# Patient Record
Sex: MALE | Race: WHITE | NOT HISPANIC OR LATINO | Employment: FULL TIME | URBAN - METROPOLITAN AREA
[De-identification: names, ages, dates, MRNs, and addresses within clinical notes are randomized per-mention and may not be internally consistent; named-entity substitution may affect disease eponyms.]

---

## 2018-09-17 ENCOUNTER — THERAPY VISIT (OUTPATIENT)
Dept: PHYSICAL THERAPY | Facility: CLINIC | Age: 48
End: 2018-09-17
Payer: COMMERCIAL

## 2018-09-17 DIAGNOSIS — M54.2 CERVICALGIA: Primary | ICD-10-CM

## 2018-09-17 PROCEDURE — 97110 THERAPEUTIC EXERCISES: CPT | Mod: GP | Performed by: PHYSICAL THERAPIST

## 2018-09-17 PROCEDURE — 97140 MANUAL THERAPY 1/> REGIONS: CPT | Mod: GP | Performed by: PHYSICAL THERAPIST

## 2018-09-17 PROCEDURE — 97161 PT EVAL LOW COMPLEX 20 MIN: CPT | Mod: GP | Performed by: PHYSICAL THERAPIST

## 2018-09-17 NOTE — MR AVS SNAPSHOT
"              After Visit Summary   9/17/2018    Leroy Padilla    MRN: [id number]           Patient Information     Date Of Birth          1970        Visit Information        Provider Department      9/17/2018 12:10 PM [facility]        Today's Diagnoses     Cervicalgia    -  1       Follow-ups after your visit        Your next 10 appointments already scheduled     Sep 24, 2018 11:30 AM CDT   (Arrive by 11:15 AM)   [facility] Spine with Chaya Anderson PT   [facility] ([facility])    [address]  [facility] [address] [phone]              Who to contact     If you have questions or need follow up information about today's clinic visit or your schedule please contact [facility] directly at [phone].  Normal or non-critical lab and imaging results will be communicated to you by Plumhart, letter or phone within 4 business days after the clinic has received the results. If you do not hear from us within 7 days, please contact the clinic through Plumhart or phone. If you have a critical or abnormal lab result, we will notify you by phone as soon as possible.  Submit refill requests through [employer] or call your pharmacy and they will forward the refill request to us. Please allow 3 business days for your refill to be completed.          Additional Information About Your Visit        PlumharStalkthis Information     [employer] lets you send messages to your doctor, view your test results, renew your prescriptions, schedule appointments and more. To sign up, go to [email or web address] . Click on \"Log in\" on the left side of the screen, which will take you to the Welcome page. Then click on \"Sign up Now\" on the right side of the page.     You will be asked to enter the access code listed below, as well as some personal information. " Please follow the directions to create your username and password.     Your access code is: [email or web address]  Expires: 2018  3:11 PM     Your access code will  in 90 days. If you need help or a new code, please call your [place] clinic or [phone].        Care EveryWhere ID     This is your Care EveryWhere ID. This could be used by other organizations to access your [place] medical records  [id number]         Blood Pressure from Last 3 Encounters:   No data found for BP    Weight from Last 3 Encounters:   No data found for Wt              We Performed the Following     MICHELLE Inital Eval Report     Manual Ther Tech, 1+Regions, EA 15 min     PT Eval, Low Complexity (955[address])     Therapeutic Exercises        Primary Care Provider Office Phone # Fax #    Ssz JOEY AURE Boswell [phone]       [facility] [address] [address]  [facility] [address]        Equal Access to Services     MAZIN MARTIN : Hadii aad ku hadasho Soomaali, waaxda luqadaha, qaybta kaalmada adeegyada, waxay idiin hayaan ariana max . So [place] -[phone].    ATENCIÓN: Si habla español, tiene a pacheco disposición servicios gratuitos de asistencia lingüística. Llame al [phone].    We comply with applicable federal civil rights laws and [place] laws. We do not discriminate on the basis of race, color, national origin, age, disability, sex, sexual orientation, or gender identity.            Thank you!     Thank you for choosing [facility]  for your care. Our goal is always to provide you with excellent care. Hearing back from our patients is one way we can continue to improve our services. Please take a few minutes to complete the written survey that you may receive in the mail after your visit with us. Thank you!             Your Updated Medication List - Protect others around you: Learn how to safely use, store and throw away your medicines at  www.disposemymeds.org.      Notice  As of 9/17/2018  3:11 PM    You have not been prescribed any medications.

## 2018-09-17 NOTE — PROGRESS NOTES
[facility] Initial Evaluation  Subjective:  Patient is a 48 year old male presenting with rehab cervical spine hpi. The history is provided by the patient. No  was used.   Leroy Padilla is a 48 year old male with a cervical spine condition.  Condition occurred with:  Insidious onset.  Condition occurred: for unknown reasons.  This is a recurrent condition  Reports chronic neck pain for many years with a laminectomy (C3-7) in 2013. The surgery abolished the L UE pain/numbness with on/off neck pain since then. He c/o increased central neck pain radiating into UT since 7/2018. MD order for PT dated 9/11/18. Patient has medical history of ankylosing spondylitis with history of chronic LBP.    Patient reports pain:  Central cervical spine.  Radiates to:  Shoulder left and other (UT).  Pain is described as sharp and is constant and reported as 6/10.  Associated symptoms:  Headache, numbness and loss of motion/stiffness. Pain is worse during the night.  Symptoms are exacerbated by driving, looking up or down, rotating head, lying down and other (sleep/reading) and relieved by heat and NSAID's.  Since onset symptoms are gradually worsening.  Special tests:  Other (none recent).      General health as reported by patient is fair.  Pertinent medical history includes:  Migraines/headaches, numbness/tingling and other (ankylosing spondylitis).  Medical allergies: yes (tramadol codeine).  Other surgeries include:  Other (cervical laminectomy 2013).  Current medications:  Other (triamterene, statin).  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          Red flags:  Pain at rest/night, severe headaches and severe dizziness.                        Objective:  Standing Alignment:    Cervical/Thoracic:  Forward head  Shoulder/UE:  Rounded shoulders                  Flexibility/Screens:         Spine:  Decreased left spine flexibility:   Upper Trap and Levator    Decreased right spine flexibility:  Upper Trap and Levator                  Cervical/Thoracic Evaluation    AROM:  AROM Cervical:    Flexion:            Mod limitation increased B neck pain during mvmt, no worse  Extension:       Mod limitation no affect during or after  Rotation:         Left: mod limitation increased central neck pain during no worse     Right: mod limitation increased central neck pain during, no worse  Side Bend:      Left: min limitation R UT pain     Right:  Min limitation L UT pain    Strength: slouched sitting posture  Headaches: cervical (1x month)  Cervical Myotomes:  normal                        Cervical Palpation:    Tenderness present at Left:    Rhomboids; Upper Trap; Levator and Suboccipitals  Tenderness present at Right:    Suboccipitals                                                  Mohini Cervical Evaluation        Test Movements:      RET:   Repeat RET: During: increases  After: no worse  Mechanical Response: IncROM                                                                     ROS    Assessment/Plan:    Patient is a 48 year old male with cervical complaints.    Patient has the following significant findings with corresponding treatment plan.                Diagnosis 1:  Neck pain  Pain -  hot/cold therapy, manual therapy, self management, education, directional preference exercise and home program  Decreased ROM/flexibility - manual therapy and therapeutic exercise  Impaired muscle performance - neuro re-education and home program  Decreased function - therapeutic activities and home program  Impaired posture - neuro re-education and home program    Therapy Evaluation Codes:   1) History comprised of:   Personal factors that impact the plan of care:      None.    Comorbidity factors that impact the plan of care are:      ankylosing spondylitis.     Medications impacting care: None.  2) Examination of Body Systems comprised of:   Body structures  and functions that impact the plan of care:      Cervical spine.   Activity limitations that impact the plan of care are:      Driving, Lifting, Reading/Computer work and Sleeping.  3) Clinical presentation characteristics are:   Stable/Uncomplicated.  4) Decision-Making    Low complexity using standardized patient assessment instrument and/or measureable assessment of functional outcome.  Cumulative Therapy Evaluation is: Low complexity.    Previous and current functional limitations:  (See Goal Flow Sheet for this information)    Short term and Long term goals: (See Goal Flow Sheet for this information)     Communication ability:  Patient appears to be able to clearly communicate and understand verbal and written communication and follow directions correctly.  Treatment Explanation - The following has been discussed with the patient:   RX ordered/plan of care  Anticipated outcomes  Possible risks and side effects  This patient would benefit from PT intervention to resume normal activities.   Rehab potential is good.    Frequency:  1 X week, once daily  Duration:  for 8 weeks  Discharge Plan:  Achieve all LTG.  Independent in home treatment program.  Reach maximal therapeutic benefit.    Please refer to the daily flowsheet for treatment today, total treatment time and time spent performing 1:1 timed codes.

## 2018-09-17 NOTE — LETTER
[facility]  [address]  [facility] [id number] [phone]    2018    Re: Leroy Padilla   :   1970  MRN:  [id number]   REFERRING PHYSICIAN:   Noelle Boswell    [facility]    Date of Initial Evaluation:  2018  Visits:  Rxs Used: 1  Reason for Referral:  Cervicalgia    EVALUATION SUMMARY    [facility] Initial Evaluation  Subjective:  Patient is a 48 year old male presenting with rehab cervical spine hpi. The history is provided by the patient. No  was used.   Leroy Padilla is a 48 year old male with a cervical spine condition.  Condition occurred with:  Insidious onset.  Condition occurred: for unknown reasons.  This is a recurrent condition  Reports chronic neck pain for many years with a laminectomy (C3-7) in . The surgery abolished the L UE pain/numbness with on/off neck pain since then. He c/o increased central neck pain radiating into UT since 2018. MD order for PT dated 18. Patient has medical history of ankylosing spondylitis with history of chronic LBP.    Patient reports pain:  Central cervical spine.  Radiates to:  Shoulder left and other (UT).  Pain is described as sharp and is constant and reported as 6/10.  Associated symptoms:  Headache, numbness and loss of motion/stiffness. Pain is worse during the night.  Symptoms are exacerbated by driving, looking up or down, rotating head, lying down and other (sleep/reading) and relieved by heat and NSAID's.  Since onset symptoms are gradually worsening.  Special tests:  Other (none recent).      General health as reported by patient is fair.  Pertinent medical history includes:  Migraines/headaches, numbness/tingling and other (ankylosing spondylitis).  Medical allergies: yes (tramadol codeine).  Other surgeries include:  Other (cervical laminectomy  2013).  Current medications:  Other (triamterene, statin).  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    Red flags:  Pain at rest/night, severe headaches and severe dizziness.  Objective:  Standing Alignment:    Cervical/Thoracic:  Forward head  Shoulder/UE:  Rounded shoulders  Flexibility/Screens:   Spine:  Decreased left spine flexibility:  Upper Trap and Levator  Decreased right spine flexibility:  Upper Trap and Levator                  Cervical/Thoracic Evaluation  AROM:  AROM Cervical:  Flexion:            Mod limitation increased B neck pain during mvmt, no worse  Extension:       Mod limitation no affect during or after  Rotation:         Left: mod limitation increased central neck pain during no worse     Right: mod limitation increased central neck pain during, no worse  Side Bend:      Left: min limitation R UT pain     Right:  Min limitation L UT pain  Strength: slouched sitting posture  Headaches: cervical (1x month)  Cervical Myotomes:  normal  Cervical Palpation:    Tenderness present at Left:    Rhomboids; Upper Trap; Levator and Suboccipitals  Tenderness present at Right:    Suboccipitals  Mohini Cervical Evaluation  Test Movements:  RET:   Repeat RET: During: increases  After: no worse  Mechanical Response: IncROM  Assessment/Plan:    Patient is a 48 year old male with cervical complaints.    Patient has the following significant findings with corresponding treatment plan.                Diagnosis 1:  Neck pain  Pain -  hot/cold therapy, manual therapy, self management, education, directional preference exercise and home program  Decreased ROM/flexibility - manual therapy and therapeutic exercise  Impaired muscle performance - neuro re-education and home program  Decreased function - therapeutic activities and home program  Impaired posture - neuro re-education and home program    Therapy Evaluation Codes:   1) History comprised  of:   Personal factors that impact the plan of care:      None.    Comorbidity factors that impact the plan of care are:      ankylosing spondylitis.     Medications impacting care: None.  2) Examination of Body Systems comprised of:   Body structures and functions that impact the plan of care:      Cervical spine.   Activity limitations that impact the plan of care are:      Driving, Lifting, Reading/Computer work and Sleeping.  3) Clinical presentation characteristics are:   Stable/Uncomplicated.  4) Decision-Making    Low complexity using standardized patient assessment instrument and/or measureable assessment of functional outcome.  Cumulative Therapy Evaluation is: Low complexity.    Previous and current functional limitations:  (See Goal Flow Sheet for this information)    Short term and Long term goals: (See Goal Flow Sheet for this information)     Communication ability:  Patient appears to be able to clearly communicate and understand verbal and written communication and follow directions correctly.  Treatment Explanation - The following has been discussed with the patient:   RX ordered/plan of care  Anticipated outcomes  Possible risks and side effects  This patient would benefit from PT intervention to resume normal activities.   Rehab potential is good.    Frequency:  1 X week, once daily  Duration:  for 8 weeks  Discharge Plan:  Achieve all LTG.  Independent in home treatment program.  Reach maximal therapeutic benefit.    Thank you for your referral.    INQUIRIES  Therapist: Chaya Anderson, PT  [facility]ICAL THERAP[facility]  [address] [place] [address]. [address]  [facility] [address]  Phone: [phone]  Fax: [phone]

## 2018-09-24 ENCOUNTER — THERAPY VISIT (OUTPATIENT)
Dept: PHYSICAL THERAPY | Facility: CLINIC | Age: 48
End: 2018-09-24
Payer: COMMERCIAL

## 2018-09-24 DIAGNOSIS — M54.2 CERVICALGIA: ICD-10-CM

## 2018-09-24 PROCEDURE — 97140 MANUAL THERAPY 1/> REGIONS: CPT | Mod: GP | Performed by: PHYSICAL THERAPIST

## 2018-09-24 PROCEDURE — 97110 THERAPEUTIC EXERCISES: CPT | Mod: GP | Performed by: PHYSICAL THERAPIST

## 2018-09-24 PROCEDURE — 97012 MECHANICAL TRACTION THERAPY: CPT | Mod: GP | Performed by: PHYSICAL THERAPIST

## 2018-09-24 NOTE — PROGRESS NOTES
Subjective:  HPI                    Objective:  System    Physical Exam    General     ROS    Assessment/Plan:    SUBJECTIVE  Subjective changes as noted by pt:  I have a lot of L upper shoulder neck tension and pain today. I did a lot of driving over the weekend.  I've been doing the chin tucks and they go ok.   Current pain level:  Current Pain level: 7/10   Changes in function:  None     Adverse reaction to treatment or activity:  None    OBJECTIVE  Changes in objective findings: improved neutral sitting posture with verbal cues,  PT L UT/rhomboid- added MT and tolerated well.  Trial of mechanical traction- no affect during or after Rx        ASSESSMENT  Leroy continues to require intervention to meet STG and LTG's: PT  Patient is becoming more independent in home exercise program    Progress made towards STG/LTG?  None    PLAN  Current treatment program is being advanced to more complex exercises.  The following procedures have been added:  AROM, neuromuscular re-education, manual therapy and mechanical traction    PTA/ATC plan:  N/A    Please refer to the daily flowsheet for treatment today, total treatment time and time spent performing 1:1 timed codes.

## 2018-09-24 NOTE — MR AVS SNAPSHOT
"              After Visit Summary   9/24/2018    Leroy Padilla    MRN: [id number]           Patient Information     Date Of Birth          1970        Visit Information        Provider Department      9/24/2018 11:30 AM Chaya Anderson PT [facility]        Today's Diagnoses     Cervicalgia           Follow-ups after your visit        Your next 10 appointments already scheduled     Oct 01, 2018 11:30 AM CDT   MICHELLE Spine with Chaya Anderson PT   [facility] ([facility])    [address]  [facility] [address] [phone]              Who to contact     If you have questions or need follow up information about today's clinic visit or your schedule please contact [facility] PHYSICAL [facility] directly at [phone].  Normal or non-critical lab and imaging results will be communicated to you by MyChart, letter or phone within 4 business days after the clinic has received the results. If you do not hear from us within 7 days, please contact the clinic through MyChart or phone. If you have a critical or abnormal lab result, we will notify you by phone as soon as possible.  Submit refill requests through [employer] or call your pharmacy and they will forward the refill request to us. Please allow 3 business days for your refill to be completed.          Additional Information About Your Visit        MyChart Information     [employer] lets you send messages to your doctor, view your test results, renew your prescriptions, schedule appointments and more. To sign up, go to [email or web address] . Click on \"Log in\" on the left side of the screen, which will take you to the Welcome page. Then click on \"Sign up Now\" on the right side of the page.     You will be asked to enter the access code listed below, as well as some personal information. Please follow the " directions to create your username and password.     Your access code is: [email or web address]  Expires: 2018  3:11 PM     Your access code will  in 90 days. If you need help or a new code, please call your [place] clinic or [phone].        Care EveryWhere ID     This is your Care EveryWhere ID. This could be used by other organizations to access your [place] medical records  [id number]         Blood Pressure from Last 3 Encounters:   No data found for BP    Weight from Last 3 Encounters:   No data found for Wt              We Performed the Following     Manual Ther Tech, 1+Regions, EA 15 min     Mechanical Traction Therapy     Therapeutic Exercises        Primary Care Provider Office Phone # Fax #    Drake JOEY Boswell PA-C [phone]       [facility] [address] [address]  [facility] [address]        Equal Access to Services     [facility]: Hadii aad ku hadasho Soomaali, waaxda luqadaha, qaybta kaalmada adeegyada, waxay adolfoin hayaan adeparth max . So [facility] [phone].    ATENCIÓN: Si habla español, tiene a pacheco disposición servicios gratuitos de asistencia lingüística. Llame al [phone].    We comply with applicable federal civil rights laws and [place] laws. We do not discriminate on the basis of race, color, national origin, age, disability, sex, sexual orientation, or gender identity.            Thank you!     Thank you for choosing [facility] PHYSICAL THERAPY  for your care. Our goal is always to provide you with excellent care. Hearing back from our patients is one way we can continue to improve our services. Please take a few minutes to complete the written survey that you may receive in the mail after your visit with us. Thank you!             Your Updated Medication List - Protect others around you: Learn how to safely use, store and throw away your medicines at www.disposemymeds.org.      Notice  As of 2018  1:10  PM    You have not been prescribed any medications.

## 2018-10-01 ENCOUNTER — THERAPY VISIT (OUTPATIENT)
Dept: PHYSICAL THERAPY | Facility: CLINIC | Age: 48
End: 2018-10-01
Payer: COMMERCIAL

## 2018-10-01 DIAGNOSIS — M54.2 CERVICALGIA: ICD-10-CM

## 2018-10-01 PROCEDURE — 97112 NEUROMUSCULAR REEDUCATION: CPT | Mod: GP | Performed by: PHYSICAL THERAPIST

## 2018-10-01 PROCEDURE — 97140 MANUAL THERAPY 1/> REGIONS: CPT | Mod: GP | Performed by: PHYSICAL THERAPIST

## 2018-10-01 PROCEDURE — 97012 MECHANICAL TRACTION THERAPY: CPT | Mod: GP | Performed by: PHYSICAL THERAPIST

## 2018-10-01 NOTE — PROGRESS NOTES
Subjective:  HPI                    Objective:  System    Physical Exam    General     ROS    Assessment/Plan:    SUBJECTIVE  Subjective changes as noted by pt:  My neck is feeling about the same as last week but I have more pain in the L upper shld this AM. HEP is going ok.     Current pain level: 6/10     Changes in function:  Yes (See Goal flowsheet attached for changes in current functional level)     Adverse reaction to treatment or activity:  None    OBJECTIVE  Changes in objective findings:  Increased PT/ mm tension L UT/levator and CHAY's- no change in PL following MT and cervical traction.  Instructed in pull down- manual/verbal cues needed for correct mvmt of scapular depression/retraction         ASSESSMENT  Leroy continues to require intervention to meet STG and LTG's: PT  Patient is progressing as expected.  Patient is becoming more independent in home exercise program  Response to therapy has shown an improvement in  posture  Progress made towards STG/LTG?  Yes (See Goal flowsheet attached for updates on achievement of STG and LTG)    PLAN  Current treatment program is being advanced to more complex exercises.  The following procedures have been added:  neuromuscular re-education    PTA/ATC plan:  N/A    Please refer to the daily flowsheet for treatment today, total treatment time and time spent performing 1:1 timed codes.

## 2018-10-01 NOTE — MR AVS SNAPSHOT
"              After Visit Summary   10/1/2018    Leroy Padilla    MRN: [id number]           Patient Information     Date Of Birth          1970        Visit Information        Provider Department      10/1/2018 11:30 AM Chaya Anderson PT [facility]        Today's Diagnoses     Cervicalgia           Follow-ups after your visit        Your next 10 appointments already scheduled     Oct 10, 2018 10:50 AM CDT   MICHELLE Spine with Chaya Anderson PT   [facility] ([facility])    [address]  [facility] [address] [phone]              Who to contact     If you have questions or need follow up information about today's clinic visit or your schedule please contact [facility] directly at [phone].  Normal or non-critical lab and imaging results will be communicated to you by MyChart, letter or phone within 4 business days after the clinic has received the results. If you do not hear from us within 7 days, please contact the clinic through MyChart or phone. If you have a critical or abnormal lab result, we will notify you by phone as soon as possible.  Submit refill requests through [employer] or call your pharmacy and they will forward the refill request to us. Please allow 3 business days for your refill to be completed.          Additional Information About Your Visit        MyChart Information     [employer] lets you send messages to your doctor, view your test results, renew your prescriptions, schedule appointments and more. To sign up, go to [email or web address] . Click on \"Log in\" on the left side of the screen, which will take you to the Welcome page. Then click on \"Sign up Now\" on the right side of the page.     You will be asked to enter the access code listed below, as well as some personal information. Please follow the " directions to create your username and password.     Your access code is: [email or web address]  Expires: 2018  3:11 PM     Your access code will  in 90 days. If you need help or a new code, please call your [place] clinic or [phone].        Care EveryWhere ID     This is your Care EveryWhere ID. This could be used by other organizations to access your [place] medical records  [id number]         Blood Pressure from Last 3 Encounters:   No data found for BP    Weight from Last 3 Encounters:   No data found for Wt              We Performed the Following     Manual Ther Tech, 1+Regions, EA 15 min     Mechanical Traction Therapy     Neuromuscular Re-Education        Primary Care Provider Office Phone # Fax #    Drake JOEY Boswell PA-C [phone]       [facility] [address] [facility] [address]  [facility] [address]        Equal Access to Services     [facility]: Hadii aad ku hadasho Soomaali, waaxda luqadaha, qaybta kaalmada adeegyada, waxay adolfoin hayaan ariana max . So [facility] [phone].    ATENCIÓN: Si habla español, tiene a pacheco disposición servicios gratuitos de asistencia lingüística. Llame al [phone].    We comply with applicable federal civil rights laws and [place] laws. We do not discriminate on the basis of race, color, national origin, age, disability, sex, sexual orientation, or gender identity.            Thank you!     Thank you for choosing [facility] PH[facility] THERAP[facility]  for your care. Our goal is always to provide you with excellent care. Hearing back from our patients is one way we can continue to improve our services. Please take a few minutes to complete the written survey that you may receive in the mail after your visit with us. Thank you!             Your Updated Medication List - Protect others around you: Learn how to safely use, store and throw away your medicines at www.disposemymeds.org.      Notice  As of 10/1/2018   2:56 PM    You have not been prescribed any medications.

## 2018-10-10 ENCOUNTER — THERAPY VISIT (OUTPATIENT)
Dept: PHYSICAL THERAPY | Facility: CLINIC | Age: 48
End: 2018-10-10
Payer: COMMERCIAL

## 2018-10-10 DIAGNOSIS — M54.2 CERVICALGIA: ICD-10-CM

## 2018-10-10 PROCEDURE — 97110 THERAPEUTIC EXERCISES: CPT | Mod: GP | Performed by: PHYSICAL THERAPIST

## 2018-10-10 PROCEDURE — 97140 MANUAL THERAPY 1/> REGIONS: CPT | Mod: GP | Performed by: PHYSICAL THERAPIST

## 2018-10-10 PROCEDURE — 97012 MECHANICAL TRACTION THERAPY: CPT | Mod: GP | Performed by: PHYSICAL THERAPIST

## 2018-10-10 NOTE — PROGRESS NOTES
Subjective:  HPI                    Objective:  System    Physical Exam    General     ROS    Assessment/Plan:    SUBJECTIVE  Subjective changes as noted by pt:  My neck/ upper back is feeling better at night but I still wake up a lot due to neuropathy in both feet.HEP going ok. I just feel a lot of stiffness in the neck.     Current pain level: 4/10     Changes in function:  Yes (See Goal flowsheet attached for changes in current functional level)     Adverse reaction to treatment or activity:  None    OBJECTIVE  Changes in objective findings:  Yes,Less PT/ mm tension L UT/levator- mild PT R UT   Instructed in UT stretch B and wall push ups for scap/thoracic strengthening  Decreased neck PL2/10 following MT and traction        ASSESSMENT  Leroy continues to require intervention to meet STG and LTG's: PT  Patient's symptoms are resolving.  Patient is progressing as expected.  Patient is becoming more independent in home exercise program  Response to therapy has shown an improvement in  pain level, ROM , muscle control and posture  Progress made towards STG/LTG?  Yes (See Goal flowsheet attached for updates on achievement of STG and LTG)    PLAN  Current treatment program is being advanced to more complex exercises.  The following procedures have been added:  stretching and strengthening    PTA/ATC plan:  N/A    Please refer to the daily flowsheet for treatment today, total treatment time and time spent performing 1:1 timed codes.

## 2018-10-10 NOTE — MR AVS SNAPSHOT
"              After Visit Summary   10/10/2018    Leroy Padilla    MRN: [id number]           Patient Information     Date Of Birth          1970        Visit Information        Provider Department      10/10/2018 10:50 AM [facility] PT [facility]        Today's Diagnoses     Cervicalgia           Follow-ups after your visit        Who to contact     If you have questions or need follow up information about today's clinic visit or your schedule please [facility] directly at [phone].  Normal or non-critical lab and imaging results will be communicated to you by [employer], letter or phone within 4 business days after the clinic has received the results. If you do not hear from us within 7 days, please contact the clinic through [employer] or phone. If you have a critical or abnormal lab result, we will notify you by phone as soon as possible.  Submit refill requests through [employer] or call your pharmacy and they will forward the refill request to us. Please allow 3 business days for your refill to be completed.          Additional Information About Your Visit        MyChart Information     [employer] lets you send messages to your doctor, view your test results, renew your prescriptions, schedule appointments and more. To sign up, go to [email or web address] . Click on \"Log in\" on the left side of the screen, which will take you to the Welcome page. Then click on \"Sign up Now\" on the right side of the page.     You will be asked to enter the access code listed below, as well as some personal information. Please follow the directions to create your username and password.     Your access code is: [email or web address]  Expires: 2018  3:11 PM     Your access code will  in 90 days. If you need help or a new code, please call your [place] clinic or [phone].        Care EveryWhere ID     This is " your Care EveryWhere ID. This could be used by other organizations to access your [place] medical records  [id number]         Blood Pressure from Last 3 Encounters:   No data found for BP    Weight from Last 3 Encounters:   No data found for Wt              We Performed the Following     Manual Ther Tech, 1+Regions, EA 15 min     Mechanical Traction Therapy     Therapeutic Exercises        Primary Care Provider Office Phone # Fax #    Noelle COOK AURE Boswell [phone]       [facility] [address] [address]  [facility] [place] [facility] [address]        Equal Access to Services     [facility]: Hadii aad ku hadasho Soomaali, waaxda luqadaha, qaybta kaalmada adeegyada, waxay adolfoin haylorenzon adeparth max . So [facility] [phone].    ATENCIÓN: Si habla español, tiene a pacheco disposición servicios gratuitos de asistencia lingüística. [facility] [phone].    We comply with applicable federal civil rights laws and [place] laws. We do not discriminate on the basis of race, color, national origin, age, disability, sex, sexual orientation, or gender identity.            Thank you!     Thank you for choosing [facility] PH[facility] THERAP[facility]  for your care. Our goal is always to provide you with excellent care. Hearing back from our patients is one way we can continue to improve our services. Please take a few minutes to complete the written survey that you may receive in the mail after your visit with us. Thank you!             Your Updated Medication List - Protect others around you: Learn how to safely use, store and throw away your medicines at www.disposemymeds.org.      Notice  As of 10/10/2018 11:50 AM    You have not been prescribed any medications.

## 2018-10-15 ENCOUNTER — THERAPY VISIT (OUTPATIENT)
Dept: PHYSICAL THERAPY | Facility: CLINIC | Age: 48
End: 2018-10-15
Payer: COMMERCIAL

## 2018-10-15 DIAGNOSIS — M54.2 CERVICALGIA: ICD-10-CM

## 2018-10-15 PROCEDURE — 97140 MANUAL THERAPY 1/> REGIONS: CPT | Mod: GP | Performed by: PHYSICAL THERAPIST

## 2018-10-15 PROCEDURE — 97110 THERAPEUTIC EXERCISES: CPT | Mod: GP | Performed by: PHYSICAL THERAPIST

## 2018-10-15 PROCEDURE — 97012 MECHANICAL TRACTION THERAPY: CPT | Mod: GP | Performed by: PHYSICAL THERAPIST

## 2018-10-15 NOTE — MR AVS SNAPSHOT
"              After Visit Summary   10/15/2018    Leroy Padilla    MRN: [id number]           Patient Information     Date Of Birth          1970        Visit Information        Provider Department      10/15/2018 10:50 AM Chaya Anderson PT [facility]        Today's Diagnoses     Cervicalgia           Follow-ups after your visit        Your next 10 appointments already scheduled     Oct 29, 2018 10:50 AM CDT   MICHELLE Spine with Chaya Anderson PT   [facility] ([facility])    [address]  [facility] [address] [phone]              Who to contact     If you have questions or need follow up information about today's clinic visit or your schedule please contact [facility] PHYSICAL [facility] directly at [phone].  Normal or non-critical lab and imaging results will be communicated to you by MyChart, letter or phone within 4 business days after the clinic has received the results. If you do not hear from us within 7 days, please contact the clinic through MyChart or phone. If you have a critical or abnormal lab result, we will notify you by phone as soon as possible.  Submit refill requests through [employer] or call your pharmacy and they will forward the refill request to us. Please allow 3 business days for your refill to be completed.          Additional Information About Your Visit        MyChart Information     [employer] lets you send messages to your doctor, view your test results, renew your prescriptions, schedule appointments and more. To sign up, go to [email or web address] . Click on \"Log in\" on the left side of the screen, which will take you to the Welcome page. Then click on \"Sign up Now\" on the right side of the page.     You will be asked to enter the access code listed below, as well as some personal information. Please follow the " directions to create your username and password.     Your access code is: [email or web address]  Expires: 2018  3:11 PM     Your access code will  in 90 days. If you need help or a new code, please call your [place] clinic or [phone].        Care EveryWhere ID     This is your Care EveryWhere ID. This could be used by other organizations to access your [place] medical records  [id number]         Blood Pressure from Last 3 Encounters:   No data found for BP    Weight from Last 3 Encounters:   No data found for Wt              We Performed the Following     Manual Ther Tech, 1+Regions, EA 15 min     Mechanical Traction Therapy     Therapeutic Exercises        Primary Care Provider Office Phone # Fax #    Drake JOEY Boswell PA-C [phone]       [facility] [address] [address]  [facility] [address]        Equal Access to Services     [facility]: Hadii aad ku hadasho Soomaali, waaxda luqadaha, qaybta kaalmada adeegyada, waxay adolfoin hayaan adeparth max . So [facility] [phone].    ATENCIÓN: Si habla español, tiene a pacheco disposición servicios gratuitos de asistencia lingüística. Llame al [phone].    We comply with applicable federal civil rights laws and [place] laws. We do not discriminate on the basis of race, color, national origin, age, disability, sex, sexual orientation, or gender identity.            Thank you!     Thank you for choosing [facility] PHYSICAL THERAPY  for your care. Our goal is always to provide you with excellent care. Hearing back from our patients is one way we can continue to improve our services. Please take a few minutes to complete the written survey that you may receive in the mail after your visit with us. Thank you!             Your Updated Medication List - Protect others around you: Learn how to safely use, store and throw away your medicines at www.disposemymeds.org.      Notice  As of 10/15/2018   2:37 PM    You have not been prescribed any medications.

## 2018-10-15 NOTE — PROGRESS NOTES
Subjective:  HPI                    Objective:  System    Physical Exam    General     ROS    Assessment/Plan:    SUBJECTIVE  Subjective changes as noted by pt:  Neck is not disrupting sleep much now ,more my feet burning/ tingling that keeps me up. 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 HEP going ok.      Current pain level: 4/10     Changes in function:  Yes (See Goal flowsheet attached for changes in current functional level)     Adverse reaction to treatment or activity:  None    OBJECTIVE  Changes in objective findings:  Yes, increased CROM rotation L min limitation mild discomfort pain L neck, R rotation nil limitation- mild pain B neck  Cervical retraction with PT OP in sitting- no affect during or after, no change ROM   Mm tension remains L>R UT/levator        ASSESSMENT  Leroy continues to require intervention to meet STG and LTG's: PT  Patient is progressing as expected.  Patient is becoming more independent in home exercise program  Response to therapy has shown an improvement in  pain level, ROM , muscle control, posture and function  Progress made towards STG/LTG?  Yes (See Goal flowsheet attached for updates on achievement of STG and LTG)    PLAN  Continue current treatment plan until patient demonstrates readiness to progress to higher level exercises.    PTA/ATC plan:  N/A    Please refer to the daily flowsheet for treatment today, total treatment time and time spent performing 1:1 timed codes.

## 2018-10-29 ENCOUNTER — THERAPY VISIT (OUTPATIENT)
Dept: PHYSICAL THERAPY | Facility: CLINIC | Age: 48
End: 2018-10-29
Payer: COMMERCIAL

## 2018-10-29 DIAGNOSIS — M54.2 CERVICALGIA: ICD-10-CM

## 2018-10-29 PROCEDURE — 97140 MANUAL THERAPY 1/> REGIONS: CPT | Mod: GP | Performed by: PHYSICAL THERAPIST

## 2018-10-29 PROCEDURE — 97012 MECHANICAL TRACTION THERAPY: CPT | Mod: GP | Performed by: PHYSICAL THERAPIST

## 2018-10-29 PROCEDURE — 97110 THERAPEUTIC EXERCISES: CPT | Mod: GP | Performed by: PHYSICAL THERAPIST

## 2018-10-29 NOTE — PROGRESS NOTES
"Subjective:  HPI                    Objective:  System    Physical Exam    General     ROS    Assessment/Plan:    SUBJECTIVE  Subjective changes as noted by pt:  My L neck/ upper shoulder is feeling a lot better overall. HEP is going good.    Current pain level: 3/10     Changes in function:  Yes (See Goal flowsheet attached for changes in current functional level)     Adverse reaction to treatment or activity:  None    OBJECTIVE  Changes in objective findings:   CROM: L rotation min limitation L UT/neck +, R rotation nil limitation pain free, EXT min limitation- L UT +  cerv retraction with pt OP: no affect during, better, increase ROM  Added L mid/low cervical side glides supine- increased L rotation following MT  Instruct in deep neck flex strength supine- verbal cues needed to maintain retraction with head lift- fatigue/ weakness observed at 5 reps 5\" hold        ASSESSMENT  Leroy continues to require intervention to meet STG and LTG's: PT  Patient's symptoms are resolving.  Patient is progressing as expected.  Patient is becoming more independent in home exercise program  Response to therapy has shown an improvement in  pain level, ROM , muscle control, posture and function  Progress made towards STG/LTG?  Yes (See Goal flowsheet attached for updates on achievement of STG and LTG)    PLAN  Current treatment program is being advanced to more complex exercises.  The following procedures have been added:  strengthening    PTA/ATC plan:  N/A    Please refer to the daily flowsheet for treatment today, total treatment time and time spent performing 1:1 timed codes.          "

## 2018-10-29 NOTE — MR AVS SNAPSHOT
"              After Visit Summary   10/29/2018    Leroy Padilla    MRN: [id number]           Patient Information     Date Of Birth          1970        Visit Information        Provider Department      10/29/2018 10:50 AM Chaya Anderson PT [facility] Physical Therapy        Today's Diagnoses     Cervicalgia           Follow-ups after your visit        Your next 10 appointments already scheduled     Nov 12, 2018 11:30 AM CST   MICHELLE Spine with Chaya Anderson PT   [facility] ([facility])    [address]  [facility] [address] [phone]              Who to contact     If you have questions or need follow up information about today's clinic visit or your schedule please contact [facility] PHYSICAL [facility] directly at [phone].  Normal or non-critical lab and imaging results will be communicated to you by MyChart, letter or phone within 4 business days after the clinic has received the results. If you do not hear from us within 7 days, please contact the clinic through [employer] or phone. If you have a critical or abnormal lab result, we will notify you by phone as soon as possible.  Submit refill requests through [employer] or call your pharmacy and they will forward the refill request to us. Please allow 3 business days for your refill to be completed.          Additional Information About Your Visit        MyChart Information     [employer] lets you send messages to your doctor, view your test results, renew your prescriptions, schedule appointments and more. To sign up, go to [email or web address] . Click on \"Log in\" on the left side of the screen, which will take you to the Welcome page. Then click on \"Sign up Now\" on the right side of the page.     You will be asked to enter the access code listed below, as well as some personal information. Please follow the " directions to create your username and password.     Your access code is: [email or web address]  Expires: 2018  3:11 PM     Your access code will  in 90 days. If you need help or a new code, please call your [place] clinic or [phone].        Care EveryWhere ID     This is your Care EveryWhere ID. This could be used by other organizations to access your [place] medical records  [id number]         Blood Pressure from Last 3 Encounters:   No data found for BP    Weight from Last 3 Encounters:   No data found for Wt              We Performed the Following     Manual Ther Tech, 1+Regions, EA 15 min     Mechanical Traction Therapy     Therapeutic Exercises        Primary Care Provider Office Phone # Fax #    Drake JOEY Boswell PA-C [phone]       [facility] [address] [facility] [address]  [facility] [address]        Equal Access to Services     [facility]: Hadii aad ku hadasho Soomaali, waaxda luqadaha, qaybta kaalmada adeegyada, waxay adolfoin hayaan adeparth max . So [facility] [phone].    ATENCIÓN: Si habla español, tiene a pacheco disposición servicios gratuitos de asistencia lingüística. Llame al [phone].    We comply with applicable federal civil rights laws and [place] laws. We do not discriminate on the basis of race, color, national origin, age, disability, sex, sexual orientation, or gender identity.            Thank you!     Thank you for choosing [facility] PH[facility]ICAL THERAP[facility]  for your care. Our goal is always to provide you with excellent care. Hearing back from our patients is one way we can continue to improve our services. Please take a few minutes to complete the written survey that you may receive in the mail after your visit with us. Thank you!             Your Updated Medication List - Protect others around you: Learn how to safely use, store and throw away your medicines at www.disposemymeds.org.      Notice  As of 10/29/2018  11:53 AM    You have not been prescribed any medications.

## 2018-11-12 ENCOUNTER — THERAPY VISIT (OUTPATIENT)
Dept: PHYSICAL THERAPY | Facility: CLINIC | Age: 48
End: 2018-11-12
Payer: COMMERCIAL

## 2018-11-12 DIAGNOSIS — M54.2 CERVICALGIA: ICD-10-CM

## 2018-11-12 PROCEDURE — 97012 MECHANICAL TRACTION THERAPY: CPT | Mod: GP | Performed by: PHYSICAL THERAPIST

## 2018-11-12 PROCEDURE — 97140 MANUAL THERAPY 1/> REGIONS: CPT | Mod: GP | Performed by: PHYSICAL THERAPIST

## 2018-11-12 PROCEDURE — 97110 THERAPEUTIC EXERCISES: CPT | Mod: GP | Performed by: PHYSICAL THERAPIST

## 2018-11-12 NOTE — MR AVS SNAPSHOT
"              After Visit Summary   11/12/2018    Leroy Padilla    MRN: [id number]           Patient Information     Date Of Birth          1970        Visit Information        Provider Department      11/12/2018 11:30 AM Chaya Anderson PT [facility]        Today's Diagnoses     Cervicalgia           Follow-ups after your visit        Your next 10 appointments already scheduled     Nov 28, 2018 10:50 AM CST   MICHELLE Spine with Chaya Anderson PT   [facility] ([facility])    [address]  [facility] [address] [phone]              Who to contact     If you have questions or need follow up information about today's clinic visit or your schedule please contact [facility] directly at [phone].  Normal or non-critical lab and imaging results will be communicated to you by MyChart, letter or phone within 4 business days after the clinic has received the results. If you do not hear from us within 7 days, please contact the clinic through [employer] or phone. If you have a critical or abnormal lab result, we will notify you by phone as soon as possible.  Submit refill requests through [employer] or call your pharmacy and they will forward the refill request to us. Please allow 3 business days for your refill to be completed.          Additional Information About Your Visit        MyChart Information     [employer] lets you send messages to your doctor, view your test results, renew your prescriptions, schedule appointments and more. To sign up, go to [email or web address] . Click on \"Log in\" on the left side of the screen, which will take you to the Welcome page. Then click on \"Sign up Now\" on the right side of the page.     You will be asked to enter the access code listed below, as well as some personal information. Please follow the " directions to create your username and password.     Your access code is: [email or web address]  Expires: 2018  2:11 PM     Your access code will  in 90 days. If you need help or a new code, please call your [facility] clinic or [phone].        Care EveryWhere ID     This is your Care EveryWhere ID. This could be used by other organizations to access your [facility] medical records  [id number]         Blood Pressure from Last 3 Encounters:   No data found for BP    Weight from Last 3 Encounters:   No data found for Wt              We Performed the Following     Manual Ther Tech, 1+Regions, EA 15 min     Mechanical Traction Therapy     Therapeutic Exercises        Primary Care Provider Office Phone # Fax #    Drake JOEY Boswell PA-C [phone]       [facility] [address] [facility] [address]  [facility] [address]        Equal Access to Services     [facility]: Hadii aad ku hadasho Soomaali, waaxda luqadaha, qaybta kaalmada adeegyada, waxay adolfoin hayaan adeparth max . So [facility] [phone].    ATENCIÓN: Si habla español, tiene a pacheco disposición servicios gratuitos de asistencia lingüística. Llame al [phone].    We comply with applicable federal civil rights laws and [place] laws. We do not discriminate on the basis of race, color, national origin, age, disability, sex, sexual orientation, or gender identity.            Thank you!     Thank you for choosing [facility]  for your care. Our goal is always to provide you with excellent care. Hearing back from our patients is one way we can continue to improve our services. Please take a few minutes to complete the written survey that you may receive in the mail after your visit with us. Thank you!             Your Updated Medication List - Protect others around you: Learn how to safely use, store and throw away your medicines at www.disposemymeds.org.      Notice  As of 2018  12:56 PM    You have not been prescribed any medications.

## 2018-11-12 NOTE — PROGRESS NOTES
"Subjective:  HPI                    Objective:  System    Physical Exam    General     ROS    Assessment/Plan:    SUBJECTIVE  Subjective changes as noted by pt:  My neck and upper back pain is consistently improving without any flare - ups. I do feel stiff all over the body though.     Current pain level: 2/10     Changes in function:  Yes (See Goal flowsheet attached for changes in current functional level)     Adverse reaction to treatment or activity:  None    OBJECTIVE  Changes in objective findings:  Yes, L rotation  Min limitation cental neck pain- added AAROM L rotation with towel assist in sitting- increased ROM noted- shahnaz well  Reviewed deep neck flexor ex. - cues to maintain cerv retraction- increased reps to 10 hold 5 \"     ASSESSMENT  Leroy continues to require intervention to meet STG and LTG's: PT  Patient's symptoms are resolving.  Patient is progressing as expected.  Patient is becoming more independent in home exercise program  Response to therapy has shown an improvement in  pain level, ROM , muscle control, posture and function  Progress made towards STG/LTG?  Yes (See Goal flowsheet attached for updates on achievement of STG and LTG)    PLAN  Current treatment program is being advanced to more complex exercises.  The following procedures have been added:  PROM/AAROM  DISCHARGE REPORT    Progress reporting period is from 9/17/18 to 11/12/18.     SUBJECTIVE                   ;   ,     Patient has failed to return to therapy so current objective findings are unknown.  The subjective and objective information are from the last SOAP note on this patient.    OBJECTIVE         ASSESSMENT/PLAN  Updated problem list and treatment plan: Diagnosis 1:  Neck back pain    STG/LTGs have been met or progress has been made towards goals:    Assessment of Progress: The patient has not returned to therapy. Current status is unknown.  Self Management Plans:  Patient has been instructed in a home treatment " program.  Patient  has been instructed in self management of symptoms.    Leroy continues to require the following intervention to meet STG and LTG's: PT  The patient failed to complete scheduled/ordered appointments so current information is unknown.  We will discharge this patient from PT.    Recommendations:  DC    Please refer to the daily flowsheet for treatment today, total treatment time and time spent performing 1:1 timed codes.    PTA/ATC plan:  N/A    Please refer to the daily flowsheet for treatment today, total treatment time and time spent performing 1:1 timed codes.

## 2020-03-22 ENCOUNTER — HEALTH MAINTENANCE LETTER (OUTPATIENT)
Age: 50
End: 2020-03-22

## 2021-05-09 ENCOUNTER — HEALTH MAINTENANCE LETTER (OUTPATIENT)
Age: 51
End: 2021-05-09

## 2021-10-24 ENCOUNTER — HEALTH MAINTENANCE LETTER (OUTPATIENT)
Age: 51
End: 2021-10-24

## 2022-06-05 ENCOUNTER — HEALTH MAINTENANCE LETTER (OUTPATIENT)
Age: 52
End: 2022-06-05

## 2022-06-29 ENCOUNTER — TRANSCRIBE ORDERS (OUTPATIENT)
Dept: OTHER | Age: 52
End: 2022-06-29

## 2022-06-29 DIAGNOSIS — M54.12 CERVICAL RADICULOPATHY: Primary | ICD-10-CM

## 2022-07-08 ENCOUNTER — THERAPY VISIT (OUTPATIENT)
Dept: PHYSICAL THERAPY | Facility: CLINIC | Age: 52
End: 2022-07-08
Attending: PHYSICIAN ASSISTANT
Payer: COMMERCIAL

## 2022-07-08 DIAGNOSIS — M54.12 CERVICAL RADICULOPATHY: ICD-10-CM

## 2022-07-08 DIAGNOSIS — M54.2 NECK PAIN: ICD-10-CM

## 2022-07-08 PROCEDURE — 97162 PT EVAL MOD COMPLEX 30 MIN: CPT | Mod: GP | Performed by: PHYSICAL THERAPIST

## 2022-07-08 PROCEDURE — 97110 THERAPEUTIC EXERCISES: CPT | Mod: GP | Performed by: PHYSICAL THERAPIST

## 2022-07-08 PROCEDURE — 97530 THERAPEUTIC ACTIVITIES: CPT | Mod: GP | Performed by: PHYSICAL THERAPIST

## 2022-07-08 NOTE — PROGRESS NOTES
[facility] Initial Evaluation -- Cervical    Evaluation Date: July 8, 2022  Leroy Padilla is a 52 year old male with a neck condition.   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    Paresthesia (yes/no):  no    Present since (onset date): January 2022     Symptoms (improving/unchanging/worsening):  worsening    Symptoms commenced as a result of: unsure what caused the flare of the pain on the R side   Condition occurred in the following environment:  unknown    Symptoms at onset (neck/arm/forearm/headache): R sided symptoms   Constant symptoms (neck/arm/forearm/headache): R subocciptal region into the R UT  Intermittent symptoms (neck/arm/forearm/headache): headaches R side of the frontal region and/or R occipital  Symptoms are made worse with the following:  Always Bending, Always Turning, Always Lying, time of day - Always as the day progresses and Always PM, Sometimes On the move and looking down with woodworking  Symptoms are made better with the following: heat and ice help temporarily.      Disturbed sleep (yes/no): yes  Number of pillows: 1 and does try to roll another pillow to support his neck  Sleeping postures (prone/sup/side R/L): back    Previous episodes (0/1-5/6-10/11+): long history of neck pain with the ankylosing spondylitis     Previous history: neck pain with symptoms worse on the R.  He also experiences lower back pain with severe foot symptoms, B knee pain and shoulder symptoms.  (some from previous sports injuries and some from the ankylosing spondylitis  Previous treatments: PT in the  past.      Specific Questions: (as reported by the patient)  Dizziness/Tinnitus/Nausea/Swallowing (pos/neg): did not ask  Gait/Upper Limbs (normal/abnormal): normal  Medications (nil/NSAIDS/anlag/steroids/anticoag/other):  Other - Metformin  Medical allergies:  none  General health (excellent/good/fair/poor):  good  Pertinent medical history:  Concussions/Dizziness, Diabetes, Fibromyalgia, Osteoarthritis and ankylosing spondylitis.   Imaging (None/Xray/MRI/Other):  Not recent  Recent or major surgery (yes/no): previous cervical laminectomy C3-C7 (Dr. Kimball)  Night pain (yes/no): wakes frequently due to symptoms.  Accidents (yes/no): no  Unexplained weight loss (yes/no): no  Barriers at home: no  Other red flags: no    EXAMINATION    Posture:   Sitting (good/fair/poor): fair  Standing (good/fair/poor): fair     Protruded head (yes/no): no    Wry Neck (right/left/nil):  no  Relevant (yes/no):  no     Correction of posture(better/worse/no effect): no effect  Other observations:  Increased muscle tone B UTs.  Increased kyphosis lower cervical spine    Neurological:    Motor Deficit:  Strong and without pain   Reflexes:  Symmetrical and brisk  Sensory Deficit:  Not assessed   Dural signs:  Not assessed    Movement Loss:   Omari Mod Min Nil Pain   Protrusion    x Upper cervical   Flexion    54 Stretching in the neck   Retraction x x   Neck pain   Extension 15 deg    Neck pain   Lateral flexion R 18    Neck pain    Lateral flexion L 14    Neck pain   Rotation R 15    Neck pain   Rotation L 35    Neck pain     Test Movements:   During: produces, abolishes, increases, decreases, no effect, centralizing, peripheralizing  After: better, worse, no better, no worse, no effect, centralized, peripheralized    Pretest symptoms sittin10 R sided neck and UT pain   Symptoms During Symptoms After ROM increased ROM decreased No Effect   PRO        Rep PRO        RET Increases    No Worse         Rep RET Increases    No Worse      x    RET EXT Increases    No Worse         Rep RET EXT           Pretest symptoms lyin/10 R sided neck and UT pain   Symptoms During Symptoms After ROM increased ROM decreased No Effect   RET Increases    No Worse         Rep RET Increases    No Worse      x   RET EXT        Rep RET EXT          If required, pretest symptoms supine:      Symptoms During Symptoms After ROM increased ROM decreased No Effect   LF-R        Rep LF-R        LF-L        Rep LF-L        ROT-R Increases    No Worse         Rep ROT-R Increases    No Worse      x   ROT-L        Rep ROT-L        FLEX        Rep FLEX            Static Tests:   Protrusion:  Not assessed  Flexion:  Not assessed  Retraction:  supine in a retracted position - he did feel increased pain during; better when put a rolled towel under his neck.   Extension (sitting/prone/supine):  Not assessed    Other Tests: none    Provisional Classification:  Inconclusive/Other - Mechanically Inconclusive    Principle of Management:  Education:  We discussed since he notices a loss of motion with the pain and increased clicking/popping of the neck, we will assess for possible joint involvement.  We are looking for pain to reduced in the neck and improve to his previous level with the exercises.  Since he did not gain any relief with trial of the retraction exercise in sitting, we will trial supine and if no change will assess rotation to the R prior to looking to the L.  Discussed holding on the flexion stretching that he is doing with his Hinge program as he does feel worse after flexion biased neck exercises.  Had patient place a rolled towel under his neck while supine and that did feel better, thus trial at home to assist to improve sleep.  Can continue to use ice/heat as needed.  Has a TENs unit at home and can try using the TENs unit in addition to heat/ice.  Discussed centralization as we are looking for symptoms to move from the R UT/neck to the center.  Monitor pain, ROM  with exercise.    Equipment provided:  none  Mechanical therapy (Y/N):  yes   Extension principle:  Supine retraction with neck supported with rolled towel. 10 reps, 6 times a day     Lateral principle:  If no change with trial of retraction, then add rotation to the right while supine and neck supported with a rolled towel  Flexion principle:       Other:  Pt has had traction in the past which has helped.  Due to increased muscle tone, may benefit from manual work     ASSESSMENT/PLAN:    Patient is a 52 year old male with cervical complaints.    Patient has the following significant findings with corresponding treatment plan.                Diagnosis 1:  Neck pain with history of ankylosing spondylitis and multilevel cervical laminectomy     Pain -  hot/cold therapy, US, electric stimulation, mechanical traction, manual therapy, self management, education, directional preference exercise and home program  Decreased ROM/flexibility - manual therapy, therapeutic exercise and home program  Decreased joint mobility - manual therapy, therapeutic exercise and home program  Decreased function - therapeutic activities and home program    Therapy Evaluation Codes:   1) History comprised of:   Personal factors that impact the plan of care:      Past/current experiences.    Comorbidity factors that impact the plan of care are:      Concussion, Diabetes, Fibromyalgia, Numbness/tingling, Osteoarthritis and Pain at night/rest.     Medications impacting care: Metformin.  2) Examination of Body Systems comprised of:   Body structures and functions that impact the plan of care:      Cervical spine.   Activity limitations that impact the plan of care are:      Bending, Driving, Lifting, Reading/Computer work, Sitting, Sleeping and Laying down.  3) Clinical presentation characteristics are:   Stable/Uncomplicated.  4) Decision-Making    Moderate complexity using standardized patient assessment instrument and/or measureable assessment  of functional outcome.  Cumulative Therapy Evaluation is: Moderate complexity.    Previous and current functional limitations:  (See Goal Flow Sheet for this information)    Short term and Long term goals: (See Goal Flow Sheet for this information)     Communication ability:  Patient appears to be able to clearly communicate and understand verbal and written communication and follow directions correctly.  Treatment Explanation - The following has been discussed with the patient:   RX ordered/plan of care  Anticipated outcomes  Possible risks and side effects  This patient would benefit from PT intervention to resume normal activities.   Rehab potential is good.    Frequency:  1 X week, once daily  Duration:  for 6 visits per current order  Discharge Plan:  Achieve all LTG.  Independent in home treatment program.  Reach maximal therapeutic benefit.    Please refer to the daily flowsheet for treatment today, total treatment time and time spent performing 1:1 timed codes.

## 2022-07-08 NOTE — LETTER
[facility]  [address] [address] [place] [id number]  [phone]    2022    Re: Leroy Padilla   :   1970  MRN:  [id number]   REFERRING PHYSICIAN:   [facility]    Date of Initial Evaluation:  22  Visits:  Rxs Used: 1  Reason for Referral:     Cervical radiculopathy  Neck pain    EVALUATION SUMMARY    [facility] Initial Evaluation -- Cervical    Evaluation Date: 2022  Leroy Padilla is a 52 year old male with a neck condition.   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    Paresthesia (yes/no):  no    Present since (onset date): 2022     Symptoms (improving/unchanging/worsening):  worsening    Symptoms commenced as a result of: unsure what caused the flare of the pain on the R side   Condition occurred in the following environment:  unknown    Symptoms at onset (neck/arm/forearm/headache): R sided symptoms   Constant symptoms (neck/arm/forearm/headache): R subocciptal region into the R UT  Intermittent symptoms (neck/arm/forearm/headache): headaches R side of the frontal region and/or R occipital  Symptoms are made worse with the following:  Always Bending, Always Turning,   Re: Leroy Padilla   :   1970    Always Lying, time of day - Always as the day progresses and Always PM, Sometimes On the move and looking down with woodworking  Symptoms are made better with the following: heat and ice help temporarily.      Disturbed sleep (yes/no):  yes  Number of pillows: 1 and does try to roll another pillow to support his neck  Sleeping postures (prone/sup/side R/L): back    Previous episodes (0/1-5/6-10/11+): long history of neck pain with the ankylosing spondylitis     Previous history: neck pain with symptoms worse on the R.  He also experiences lower back pain with severe foot symptoms, B knee pain and shoulder symptoms.  (some from previous sports injuries and some from the ankylosing spondylitis  Previous treatments: PT in the past.      Specific Questions: (as reported by the patient)  Dizziness/Tinnitus/Nausea/Swallowing (pos/neg): did not ask  Gait/Upper Limbs (normal/abnormal): normal  Medications (nil/NSAIDS/anlag/steroids/anticoag/other):  Other - Metformin  Medical allergies:  none  General health (excellent/good/fair/poor):  good  Pertinent medical history:  Concussions/Dizziness, Diabetes, Fibromyalgia, Osteoarthritis and ankylosing spondylitis.   Imaging (None/Xray/MRI/Other):  Not recent  Recent or major surgery (yes/no): previous cervical laminectomy C3-C7 (Dr. Kimball)  Night pain (yes/no): wakes frequently due to symptoms.  Accidents (yes/no): no  Unexplained weight loss (yes/no): no  Barriers at home: no  Other red flags: no    EXAMINATION    Posture:   Sitting (good/fair/poor): fair  Standing (good/fair/poor): fair     Protruded head (yes/no): no    Wry Neck (right/left/nil):  no  Relevant (yes/no):  no     Correction of posture(better/worse/no effect): no effect  Other observations:  Increased muscle tone B UTs.  Increased kyphosis lower cervical spine    Neurological:    Motor Deficit:  Strong and without pain   Reflexes:  Symmetrical and brisk  Sensory Deficit:  Not assessed   Dural signs:  Not assessed    Movement Loss:  Re: Leroy Padilla   :   1970     Omari Mod Min Nil Pain   Protrusion    x Upper cervical   Flexion    54 Stretching in the neck   Retraction x x   Neck pain   Extension 15 deg    Neck pain   Lateral flexion R  18    Neck pain    Lateral flexion L 14    Neck pain   Rotation R 15    Neck pain   Rotation L 35    Neck pain     Test Movements:   During: produces, abolishes, increases, decreases, no effect, centralizing, peripheralizing  After: better, worse, no better, no worse, no effect, centralized, peripheralized    Pretest symptoms sittin/10 R sided neck and UT pain   Symptoms During Symptoms After ROM increased ROM decreased No Effect   PRO        Rep PRO        RET Increases    No Worse         Rep RET Increases    No Worse      x   RET EXT Increases    No Worse         Rep RET EXT           Pretest symptoms lyin/10 R sided neck and UT pain   Symptoms During Symptoms After ROM increased ROM decreased No Effect   RET Increases    No Worse         Rep RET Increases    No Worse      x   RET EXT        Rep RET EXT                              Re: Leroy Padilla   :   1970    If required, pretest symptoms supine:      Symptoms During Symptoms After ROM increased ROM decreased No Effect   LF-R        Rep LF-R        LF-L        Rep LF-L        ROT-R Increases    No Worse         Rep ROT-R Increases    No Worse      x   ROT-L        Rep ROT-L        FLEX        Rep FLEX          Static Tests:   Protrusion:  Not assessed  Flexion:  Not assessed  Retraction:  supine in a retracted position - he did feel increased pain during; better when put a rolled towel under his neck.   Extension (sitting/prone/supine):  Not assessed    Other Tests: none    Provisional Classification:  Inconclusive/Other - Mechanically Inconclusive    Principle of Management:  Education:  We discussed since he notices a loss of motion with the pain and increased clicking/popping of the neck, we will assess for possible joint involvement.  We are looking for pain to reduced in the neck and improve to his previous level with the exercises.  Since he did not gain any relief with trial of the retraction exercise in sitting, we will trial  supine and if no change will assess rotation to the R prior to looking to the L.  Discussed holding on the flexion stretching that he is doing with his Hinge program as he does feel worse after flexion biased neck exercises.  Had patient place a rolled towel under his neck while supine and that did feel better, thus trial at home to assist to improve sleep.  Can continue to use ice/heat as needed.  Has a TENs unit at home and can try using the TENs unit in addition to heat/ice.  Discussed centralization as we are looking for symptoms to move from the R UT/neck to the center.  Monitor pain, ROM with exercise.    Equipment provided:  none  Mechanical therapy (Y/N):  yes   Extension principle:  Supine retraction with neck supported with rolled towel. 10 reps, 6 times a day     Lateral principle:  If no change with trial of retraction, then add rotation to the right while supine and neck supported with a rolled towel  Flexion principle:       Other:  Pt has had traction in the past which has helped.  Due to increased muscle tone, may benefit from manual work     ASSESSMENT/PLAN:    Re: Leroy Padilla   :   1970    Patient is a 52 year old male with cervical complaints.    Patient has the following significant findings with corresponding treatment plan.                Diagnosis 1:  Neck pain with history of ankylosing spondylitis and multilevel cervical laminectomy     Pain -  hot/cold therapy, US, electric stimulation, mechanical traction, manual therapy, self management, education, directional preference exercise and home program  Decreased ROM/flexibility - manual therapy, therapeutic exercise and home program  Decreased joint mobility - manual therapy, therapeutic exercise and home program  Decreased function - therapeutic activities and home program    Therapy Evaluation Codes:   1) History comprised of:   Personal factors that impact the plan of care:      Past/current experiences.    Comorbidity  factors that impact the plan of care are:      Concussion, Diabetes, Fibromyalgia, Numbness/tingling, Osteoarthritis and Pain at night/rest.     Medications impacting care: Metformin.  2) Examination of Body Systems comprised of:   Body structures and functions that impact the plan of care:      Cervical spine.   Activity limitations that impact the plan of care are:      Bending, Driving, Lifting, Reading/Computer work, Sitting, Sleeping and Laying down.  3) Clinical presentation characteristics are:   Stable/Uncomplicated.  4) Decision-Making    Moderate complexity using standardized patient assessment instrument and/or measureable assessment of functional outcome.  Cumulative Therapy Evaluation is: Moderate complexity.    Previous and current functional limitations:  (See Goal Flow Sheet for this information)    Short term and Long term goals: (See Goal Flow Sheet for this information)     Communication ability:  Patient appears to be able to clearly communicate and understand verbal and written communication and follow directions correctly.  Treatment Explanation - The following has been discussed with the patient:   RX ordered/plan of care  Anticipated outcomes  Possible risks and side effects  This patient would benefit from PT intervention to resume normal activities.   Rehab potential is good.    Frequency:  1 X week, once daily  Duration:  for 6 visits per current order  Discharge Plan:  Achieve all LTG.  Independent in home treatment program.  Reach maximal therapeutic benefit.    Thank you for your referral.    Re: Leroy Padilla   :   1970    INQUIRIES  Therapist: Claire Calix PT, MARCIA, Dip MDT  [address] [address]  Phone: [phone]  Fax: [phone]

## 2022-07-18 ENCOUNTER — THERAPY VISIT (OUTPATIENT)
Dept: PHYSICAL THERAPY | Facility: CLINIC | Age: 52
End: 2022-07-18
Payer: COMMERCIAL

## 2022-07-18 DIAGNOSIS — M54.2 NECK PAIN: Primary | ICD-10-CM

## 2022-07-18 PROCEDURE — 97140 MANUAL THERAPY 1/> REGIONS: CPT | Mod: GP | Performed by: PHYSICAL THERAPIST

## 2022-07-18 PROCEDURE — 97110 THERAPEUTIC EXERCISES: CPT | Mod: GP | Performed by: PHYSICAL THERAPIST

## 2022-07-25 ENCOUNTER — THERAPY VISIT (OUTPATIENT)
Dept: PHYSICAL THERAPY | Facility: CLINIC | Age: 52
End: 2022-07-25
Payer: COMMERCIAL

## 2022-07-25 DIAGNOSIS — M54.2 NECK PAIN: Primary | ICD-10-CM

## 2022-07-25 PROCEDURE — 97140 MANUAL THERAPY 1/> REGIONS: CPT | Mod: GP | Performed by: PHYSICAL THERAPIST

## 2022-07-25 PROCEDURE — 97110 THERAPEUTIC EXERCISES: CPT | Mod: GP | Performed by: PHYSICAL THERAPIST

## 2022-08-01 ENCOUNTER — THERAPY VISIT (OUTPATIENT)
Dept: PHYSICAL THERAPY | Facility: CLINIC | Age: 52
End: 2022-08-01
Payer: COMMERCIAL

## 2022-08-01 DIAGNOSIS — M54.2 NECK PAIN: Primary | ICD-10-CM

## 2022-08-01 PROCEDURE — 97140 MANUAL THERAPY 1/> REGIONS: CPT | Mod: GP | Performed by: PHYSICAL THERAPIST

## 2022-08-01 PROCEDURE — 97110 THERAPEUTIC EXERCISES: CPT | Mod: GP | Performed by: PHYSICAL THERAPIST

## 2022-08-09 ENCOUNTER — THERAPY VISIT (OUTPATIENT)
Dept: PHYSICAL THERAPY | Facility: CLINIC | Age: 52
End: 2022-08-09
Payer: COMMERCIAL

## 2022-08-09 DIAGNOSIS — M54.2 NECK PAIN: Primary | ICD-10-CM

## 2022-08-09 PROCEDURE — 97035 APP MDLTY 1+ULTRASOUND EA 15: CPT | Mod: GP | Performed by: PHYSICAL THERAPIST

## 2022-08-09 PROCEDURE — 97110 THERAPEUTIC EXERCISES: CPT | Mod: GP | Performed by: PHYSICAL THERAPIST

## 2022-08-09 PROCEDURE — 97012 MECHANICAL TRACTION THERAPY: CPT | Mod: GP | Performed by: PHYSICAL THERAPIST

## 2022-08-15 ENCOUNTER — THERAPY VISIT (OUTPATIENT)
Dept: PHYSICAL THERAPY | Facility: CLINIC | Age: 52
End: 2022-08-15
Payer: COMMERCIAL

## 2022-08-15 DIAGNOSIS — M54.2 NECK PAIN: Primary | ICD-10-CM

## 2022-08-15 PROCEDURE — 97140 MANUAL THERAPY 1/> REGIONS: CPT | Mod: GP | Performed by: PHYSICAL THERAPIST

## 2022-08-15 PROCEDURE — 97110 THERAPEUTIC EXERCISES: CPT | Mod: GP | Performed by: PHYSICAL THERAPIST

## 2022-08-15 PROCEDURE — 97035 APP MDLTY 1+ULTRASOUND EA 15: CPT | Mod: GP | Performed by: PHYSICAL THERAPIST

## 2022-08-16 NOTE — PROGRESS NOTES
PROGRESS  REPORT    Progress reporting period is from 7/8/2022 to 8/15/2022.       SUBJECTIVE  Subjective changes noted by patient:  Patient notes that the severe pain in the UT is better; now more dull but the symptoms are remaining constant.  He is feeling a strain on the R side of the neck into the UT and having a left side of head headache..  He does feel that he is improving, 30% improved with the R sided symptoms.  Motion is improving but not back to where it was prior to the onset of symptoms.  He does find that he is looking down a great deal as he is tall; question if that is a factor.  Main complaints are the headaches and tightness in the neck and R UT.  He does feel like the headaches are similar to the ones that he had prior to having his neck surgery.   Current Pain level: 6/10.     Initial Pain level: 8/10.   Changes in function:  Yes (See Goal flowsheet attached for changes in current functional level)  Improved R rotation ROM  Adverse reaction to treatment or activity: None    OBJECTIVE  Changes noted in objective findings:  Yes, increased neck ROM, increased function.    Objective:   CROM R Rot 30, Rot L 30, SB R 20, SB L 20, Ext 24   At eval  R Rot 15, Rot L 35, SB R 18, SB L 14, ext 15  NDI score has improved to 46% from 52%    ASSESSMENT/PLAN  Updated problem list and treatment plan: Diagnosis 1:  Cervical radiculopathy    Pain -  US, manual therapy, self management, education, directional preference exercise and home program  Decreased ROM/flexibility - manual therapy, therapeutic exercise and home program  Decreased joint mobility - manual therapy, therapeutic exercise and home program  Impaired muscle performance - neuro re-education and home program  Decreased function - therapeutic activities and home program  Impaired posture - neuro re-education, therapeutic activities and home program  STG/LTGs have been met or progress has been made towards goals:  Yes (See Goal flow sheet completed  today.)  Assessment of Progress: The patient's condition has potential to improve, but progress has been slow.  Self Management Plans:  Patient has been instructed in a home treatment program.  Patient  has been instructed in self management of symptoms.  I have re-evaluated this patient and find that the nature, scope, duration and intensity of the therapy is appropriate for the medical condition of the patient.  Leroy continues to require the following intervention to meet STG and LTG's:  PT    Recommendations:  This patient would benefit from further evaluation; with slow progress, ? If imaging may be appropriate.  This patient would benefit from continued therapy.     Frequency:  1 X week, once daily  Duration:  for 6 weeks          Please refer to the daily flowsheet for treatment today, total treatment time and time spent performing 1:1 timed codes.

## 2022-08-29 ENCOUNTER — THERAPY VISIT (OUTPATIENT)
Dept: PHYSICAL THERAPY | Facility: CLINIC | Age: 52
End: 2022-08-29
Payer: COMMERCIAL

## 2022-08-29 DIAGNOSIS — M54.2 NECK PAIN: Primary | ICD-10-CM

## 2022-08-29 PROCEDURE — 97110 THERAPEUTIC EXERCISES: CPT | Mod: GP | Performed by: PHYSICAL THERAPIST

## 2022-08-29 PROCEDURE — 97035 APP MDLTY 1+ULTRASOUND EA 15: CPT | Mod: GP | Performed by: PHYSICAL THERAPIST

## 2022-08-29 PROCEDURE — 97140 MANUAL THERAPY 1/> REGIONS: CPT | Mod: GP | Performed by: PHYSICAL THERAPIST

## 2022-09-08 ENCOUNTER — THERAPY VISIT (OUTPATIENT)
Dept: PHYSICAL THERAPY | Facility: CLINIC | Age: 52
End: 2022-09-08
Payer: COMMERCIAL

## 2022-09-08 DIAGNOSIS — M54.2 NECK PAIN: Primary | ICD-10-CM

## 2022-09-08 PROCEDURE — 97035 APP MDLTY 1+ULTRASOUND EA 15: CPT | Mod: GP | Performed by: PHYSICAL THERAPIST

## 2022-09-08 PROCEDURE — 97140 MANUAL THERAPY 1/> REGIONS: CPT | Mod: GP | Performed by: PHYSICAL THERAPIST

## 2022-09-08 PROCEDURE — 97110 THERAPEUTIC EXERCISES: CPT | Mod: GP | Performed by: PHYSICAL THERAPIST

## 2022-09-13 ENCOUNTER — THERAPY VISIT (OUTPATIENT)
Dept: PHYSICAL THERAPY | Facility: CLINIC | Age: 52
End: 2022-09-13
Payer: COMMERCIAL

## 2022-09-13 DIAGNOSIS — M54.2 NECK PAIN: Primary | ICD-10-CM

## 2022-09-13 PROCEDURE — 97035 APP MDLTY 1+ULTRASOUND EA 15: CPT | Mod: GP | Performed by: PHYSICAL THERAPIST

## 2022-09-13 PROCEDURE — 97110 THERAPEUTIC EXERCISES: CPT | Mod: GP | Performed by: PHYSICAL THERAPIST

## 2022-09-13 PROCEDURE — 97140 MANUAL THERAPY 1/> REGIONS: CPT | Mod: GP | Performed by: PHYSICAL THERAPIST

## 2022-09-20 ENCOUNTER — THERAPY VISIT (OUTPATIENT)
Dept: PHYSICAL THERAPY | Facility: CLINIC | Age: 52
End: 2022-09-20
Payer: COMMERCIAL

## 2022-09-20 DIAGNOSIS — M54.2 NECK PAIN: Primary | ICD-10-CM

## 2022-09-20 PROCEDURE — 97035 APP MDLTY 1+ULTRASOUND EA 15: CPT | Mod: GP | Performed by: PHYSICAL THERAPIST

## 2022-09-20 PROCEDURE — 97140 MANUAL THERAPY 1/> REGIONS: CPT | Mod: GP | Performed by: PHYSICAL THERAPIST

## 2022-09-20 PROCEDURE — 97110 THERAPEUTIC EXERCISES: CPT | Mod: GP | Performed by: PHYSICAL THERAPIST

## 2022-09-27 ENCOUNTER — THERAPY VISIT (OUTPATIENT)
Dept: PHYSICAL THERAPY | Facility: CLINIC | Age: 52
End: 2022-09-27
Payer: COMMERCIAL

## 2022-09-27 DIAGNOSIS — M54.2 NECK PAIN: Primary | ICD-10-CM

## 2022-09-27 PROCEDURE — 97140 MANUAL THERAPY 1/> REGIONS: CPT | Mod: GP | Performed by: PHYSICAL THERAPIST

## 2022-09-27 PROCEDURE — 97110 THERAPEUTIC EXERCISES: CPT | Mod: GP | Performed by: PHYSICAL THERAPIST

## 2022-09-27 PROCEDURE — 97035 APP MDLTY 1+ULTRASOUND EA 15: CPT | Mod: GP | Performed by: PHYSICAL THERAPIST

## 2022-10-13 NOTE — PROGRESS NOTES
PROGRESS  REPORT    Progress reporting period is from 8/15/2022 to 9/27/2022.       SUBJECTIVE  Patient notes that he continues to feel stiffness on the R side of his neck and posterior cx area. Tenderness subocciptal region.  Has a rock in the R UT.  He relates feeling better when he is active, but thinks that may be due to not thinking about his symptoms when he is busy.  He is consistent with his home program.  Exercises seem to help with the ROM but not the symptoms.   Per in-basket message 10/13/2022:   Here's am update.  I finally had the MRI yesterday and I went to [facility] for initial consult on Tuesday.  Basically waiting for results to determine next step.  I got the results already.  My neck results seem worse to me than in past, but I'm not sure how they interpret to the pain I have.  Impression from radiologist below.    I'd like to thank you for the help you have done so far.  Even though it didn't seem like we got much in results I was still happy with it.    Here's what the scan showed.  IMPRESSION:       1.  Multilevel cervical degenerative disc disease, greatest at C5-C6.  2.  Moderate central canal narrowing at C6-C7 due to moderate eccentric disc bulging. Moderate/severe right neural foraminal narrowing and mild/moderate left neural foraminal narrowing at this level.  3.  Moderate bilateral neural foraminal narrowing at C3-C4, moderate/severe bilateral neural foraminal narrowing at C4-C5, severe right and moderate left neural foraminal narrowing at C5-C6 largely due to posterolateral endplate osteophytes.  4.  No evidence of focal cervical disc protrusion.  Current Pain level: 8/10.     Previous pain level was  6/10   Initial Pain level: 8/10.   Changes in function:  Yes, sharp type of pain is better, but the knot in the R UT, headaches and overall tightness continues  Adverse reaction to treatment or activity: None    OBJECTIVE  Changes noted in objective findings:  Yes, overall, ROM has improved,  posture remains poor - tends to be in a flexed head posture  Objective:   CROM Rot R 33, Rot L 42, Ext 23   Muscle tightness is better when lying down, but does not remain better.    ASSESSMENT/PLAN  Updated problem list and treatment plan: Diagnosis 1:  Neck pain, Ankylosing Spondylosis    Pain -  manual therapy, self management, education, directional preference exercise and home program  Decreased ROM/flexibility - manual therapy, therapeutic exercise and home program  Decreased joint mobility - manual therapy, therapeutic exercise and home program  Decreased function - therapeutic activities and home program  Impaired posture - neuro re-education, therapeutic activities and home program  STG/LTGs have been met or progress has been made towards goals:  Yes, ROM is variable and if better, driving is easier, but this is not consistent  Assessment of Progress: The patient's condition is relatively unchanged.  Self Management Plans:  Patient has been instructed in a home treatment program.  Patient  has been instructed in self management of symptoms.  I have re-evaluated this patient and find that the nature, scope, duration and intensity of the therapy is appropriate for the medical condition of the patient.  Leroy continues to require the following intervention to meet STG and LTG's:  Follow up with spine specialist then return to PT if indicated    Recommendations:  This patient would benefit from further evaluation.    Please refer to the daily flowsheet for treatment today, total treatment time and time spent performing 1:1 timed codes.

## 2022-11-17 PROBLEM — M54.2 NECK PAIN: Status: RESOLVED | Noted: 2022-07-08 | Resolved: 2022-11-17

## 2022-11-17 NOTE — PROGRESS NOTES
Patient did not return for further treatment and no additional progress was noted.  Please refer to the progress note and goal flowsheet completed on 09/27/22 (included inbasket message from 10/13/2022) for discharge information.

## 2023-02-08 ENCOUNTER — TRANSFERRED RECORDS (OUTPATIENT)
Dept: HEALTH INFORMATION MANAGEMENT | Facility: CLINIC | Age: 53
End: 2023-02-08

## 2023-02-08 ENCOUNTER — MEDICAL CORRESPONDENCE (OUTPATIENT)
Dept: HEALTH INFORMATION MANAGEMENT | Facility: CLINIC | Age: 53
End: 2023-02-08

## 2023-02-10 ENCOUNTER — TRANSCRIBE ORDERS (OUTPATIENT)
Dept: OTHER | Age: 53
End: 2023-02-10

## 2023-02-10 DIAGNOSIS — M54.2 CERVICALGIA: Primary | ICD-10-CM

## 2023-08-26 ENCOUNTER — HEALTH MAINTENANCE LETTER (OUTPATIENT)
Age: 53
End: 2023-08-26

## 2023-10-03 ENCOUNTER — THERAPY VISIT (OUTPATIENT)
Dept: PHYSICAL THERAPY | Facility: CLINIC | Age: 53
End: 2023-10-03
Payer: COMMERCIAL

## 2023-10-03 DIAGNOSIS — S86.012A STRAIN OF ACHILLES TENDON, LEFT, INITIAL ENCOUNTER: Primary | ICD-10-CM

## 2023-10-03 PROCEDURE — 97161 PT EVAL LOW COMPLEX 20 MIN: CPT | Mod: GP | Performed by: PHYSICAL THERAPIST

## 2023-10-03 PROCEDURE — 97110 THERAPEUTIC EXERCISES: CPT | Mod: GP | Performed by: PHYSICAL THERAPIST

## 2023-10-03 NOTE — PROGRESS NOTES
"PHYSICAL THERAPY EVALUATION  Type of Visit: Evaluation    See electronic medical record for Abuse and Falls Screening details.    Subjective       Presenting condition or subjective complaint: calf/achilles painPt was doing some landscaping at home about 2 months ago (July 2023), was squatting and when he stood up he felt a pop in the L calf. Felt burning right away. Did the \"RICE\" thing for a while and got to the point of walking normal, but about two weeks ago felt the pain again with simply walking on flat surface out of a store. Had an ultrasound to rule out DVT which was negative. Saw [facility] urgent care and referred to PT.   Date of onset: 07/03/23    Relevant medical history: Arthritis; Diabetes; Neck injury; Osteoarthritis (Ankylosing Spondylitis)   Dates & types of surgery: vein ablation June 2023, cervical laminectomy June 2013    Prior diagnostic imaging/testing results: Other (ultrasound)     Prior therapy history for the same diagnosis, illness or injury: No      Prior Level of Function  Transfers:   Ambulation:   ADL:   IADL:     Living Environment  Social support: With a significant other or spouse   Type of home: Multi-level   Stairs to enter the home: No       Ramp: No   Stairs inside the home: Yes 2 Is there a railing: Yes   Help at home: None  Equipment owned: Raised toilet seat; Crutches     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)    Hobbies/Interests: fishing, art, mechanical/vehicle things    Patient goals for therapy: reliev pain;stop recurrence    Pain assessment: See objective evaluation for additional pain details     Objective   FOOT/ANKLE EVALUATION  PAIN: Pain Level with Use: 3/10  Pain Location: high achilles, lower portion of calf bulk on L  Pain Quality: Burning and tearing  Pain Frequency: intermittent  Pain is Worst: activity related  Pain is Exacerbated By: stairs sometimes, walking  Pain is Relieved By: cold and rest  Pain Progression: Improved  INTEGUMENTARY (edema, " incisions):  general swelling with more pronounced lateral L ankle swelling  POSTURE:   GAIT:   Weightbearing Status:   Assistive Device(s):   Gait Deviations: Supination increased L  BALANCE/PROPRIOCEPTION:   WEIGHT BEARING ALIGNMENT:   NON-WEIGHTBEARING ALIGNMENT:    ROM:   (Degrees) Left AROM Left PROM  Right AROM Right PROM   Ankle Dorsiflexion 3  9    Ankle Plantarflexion 65  63    Ankle Inversion       Ankle Eversion       Great Toe Flexion       Great Toe Extension       Pain:   End feel:     STRENGTH:  able to perform 1-2 reps single leg heel raise on L with mild pain  FLEXIBILITY:   SPECIAL TESTS:    Left Right   Tinel Sign     Laird Sign Negative     Windlass Test     Ligamentous Stability     Anterior Drawer     Kleiger ER Test     Longitudinal Arch Angle Test     Talar Tilt       FUNCTIONAL TESTS:   PALPATION:   + Tenderness at Location Left Right   Gastroc/Soleus +    Achilles Tendon +    Anterior Tibialis     Posterior Tibialis     Incisional     Deltoid Ligament     Plantar Fascia     Navicular     Medial Calcaneal     Peroneals     Anterior Talofibular Ligament     Posterior Talofibular Ligament     Calcaneofibular Ligament     Medial Malleolus     Lateral Malleolus     Anterior Tibiofibular Ligament     Posterior Tibiofibular Ligament       JOINT MOBILITY:     Assessment & Plan   CLINICAL IMPRESSIONS  Medical Diagnosis: left achiles strain    Treatment Diagnosis: L achilles strain   Impression/Assessment: Patient is a 53 year old male with L ankle complaints.  The following significant findings have been identified: Pain, Decreased ROM/flexibility, Decreased strength, Inflammation, Edema, Impaired gait, Impaired muscle performance, and Decreased activity tolerance. These impairments interfere with their ability to perform recreational activities and community mobility as compared to previous level of function.     Clinical Decision Making (Complexity):  Clinical Presentation:  Evolving/Changing  Clinical Presentation Rationale: based on medical and personal factors listed in PT evaluation  Clinical Decision Making (Complexity): Low complexity    PLAN OF CARE  Treatment Interventions:  Interventions: Manual Therapy, Neuromuscular Re-education, Therapeutic Activity, Therapeutic Exercise    Long Term Goals     PT Goal 1  Goal Identifier: ambulation  Goal Description: Pt will be able to walk up to one hour pain free  Rationale: to maximize safety and independence within the community;to maximize safety and independence with performance of ADLs and functional tasks  Target Date: 11/28/23      Frequency of Treatment: 1x/wk  Duration of Treatment: 8 wks    Recommended Referrals to Other Professionals:   Education Assessment:        Risks and benefits of evaluation/treatment have been explained.   Patient/Family/caregiver agrees with Plan of Care.     Evaluation Time:     PT Eval, Low Complexity Minutes (44990): 18       Signing Clinician: Ascencion Claros PT

## 2023-10-04 ENCOUNTER — TRANSCRIBE ORDERS (OUTPATIENT)
Dept: OTHER | Age: 53
End: 2023-10-04

## 2023-11-06 PROBLEM — S86.012A STRAIN OF ACHILLES TENDON, LEFT, INITIAL ENCOUNTER: Status: RESOLVED | Noted: 2023-10-03 | Resolved: 2023-11-06

## 2024-06-01 ENCOUNTER — HEALTH MAINTENANCE LETTER (OUTPATIENT)
Age: 54
End: 2024-06-01

## 2025-06-14 ENCOUNTER — HEALTH MAINTENANCE LETTER (OUTPATIENT)
Age: 55
End: 2025-06-14